# Patient Record
Sex: FEMALE | Race: BLACK OR AFRICAN AMERICAN | NOT HISPANIC OR LATINO
[De-identification: names, ages, dates, MRNs, and addresses within clinical notes are randomized per-mention and may not be internally consistent; named-entity substitution may affect disease eponyms.]

---

## 2017-11-22 ENCOUNTER — RESULT REVIEW (OUTPATIENT)
Age: 37
End: 2017-11-22

## 2018-01-21 ENCOUNTER — TRANSCRIPTION ENCOUNTER (OUTPATIENT)
Age: 38
End: 2018-01-21

## 2018-07-06 PROBLEM — Z00.00 ENCOUNTER FOR PREVENTIVE HEALTH EXAMINATION: Status: ACTIVE | Noted: 2018-07-06

## 2018-07-09 ENCOUNTER — APPOINTMENT (OUTPATIENT)
Dept: DERMATOLOGY | Facility: CLINIC | Age: 38
End: 2018-07-09
Payer: COMMERCIAL

## 2018-07-09 VITALS — HEIGHT: 62 IN | WEIGHT: 162 LBS | BODY MASS INDEX: 29.81 KG/M2

## 2018-07-09 DIAGNOSIS — Z87.2 PERSONAL HISTORY OF DISEASES OF THE SKIN AND SUBCUTANEOUS TISSUE: ICD-10-CM

## 2018-07-09 DIAGNOSIS — D22.9 MELANOCYTIC NEVI, UNSPECIFIED: ICD-10-CM

## 2018-07-09 DIAGNOSIS — Z87.898 PERSONAL HISTORY OF OTHER SPECIFIED CONDITIONS: ICD-10-CM

## 2018-07-09 DIAGNOSIS — L82.1 OTHER SEBORRHEIC KERATOSIS: ICD-10-CM

## 2018-07-09 DIAGNOSIS — Z12.83 ENCOUNTER FOR SCREENING FOR MALIGNANT NEOPLASM OF SKIN: ICD-10-CM

## 2018-07-09 PROCEDURE — 99203 OFFICE O/P NEW LOW 30 MIN: CPT

## 2018-07-12 PROBLEM — Z12.83 SCREENING FOR MALIGNANT NEOPLASM OF SKIN: Status: ACTIVE | Noted: 2018-07-12

## 2018-07-12 PROBLEM — L82.1 DERMATOSIS PAPULOSA NIGRA: Status: ACTIVE | Noted: 2018-07-12

## 2018-10-31 ENCOUNTER — APPOINTMENT (OUTPATIENT)
Dept: ORTHOPEDIC SURGERY | Facility: CLINIC | Age: 38
End: 2018-10-31
Payer: COMMERCIAL

## 2018-10-31 VITALS — HEART RATE: 84 BPM | SYSTOLIC BLOOD PRESSURE: 123 MMHG | DIASTOLIC BLOOD PRESSURE: 85 MMHG

## 2018-10-31 DIAGNOSIS — M25.572 PAIN IN LEFT ANKLE AND JOINTS OF LEFT FOOT: ICD-10-CM

## 2018-10-31 PROCEDURE — 99203 OFFICE O/P NEW LOW 30 MIN: CPT

## 2018-10-31 PROCEDURE — 73610 X-RAY EXAM OF ANKLE: CPT | Mod: LT

## 2018-11-05 ENCOUNTER — OTHER (OUTPATIENT)
Age: 38
End: 2018-11-05

## 2018-11-09 ENCOUNTER — OTHER (OUTPATIENT)
Age: 38
End: 2018-11-09

## 2018-11-13 ENCOUNTER — APPOINTMENT (OUTPATIENT)
Dept: ORTHOPEDIC SURGERY | Facility: CLINIC | Age: 38
End: 2018-11-13
Payer: COMMERCIAL

## 2018-11-13 DIAGNOSIS — M76.822 POSTERIOR TIBIAL TENDINITIS, LEFT LEG: ICD-10-CM

## 2018-11-13 DIAGNOSIS — S93.492D SPRAIN OF OTHER LIGAMENT OF LEFT ANKLE, SUBSEQUENT ENCOUNTER: ICD-10-CM

## 2018-11-13 DIAGNOSIS — M21.6X2 OTHER ACQUIRED DEFORMITIES OF LEFT FOOT: ICD-10-CM

## 2018-11-13 PROCEDURE — 99214 OFFICE O/P EST MOD 30 MIN: CPT

## 2018-11-14 ENCOUNTER — OTHER (OUTPATIENT)
Age: 38
End: 2018-11-14

## 2018-11-28 ENCOUNTER — RESULT REVIEW (OUTPATIENT)
Age: 38
End: 2018-11-28

## 2019-03-29 ENCOUNTER — APPOINTMENT (OUTPATIENT)
Dept: INFECTIOUS DISEASE | Facility: HOSPITAL | Age: 39
End: 2019-03-29
Payer: SELF-PAY

## 2019-03-29 DIAGNOSIS — Z71.89 OTHER SPECIFIED COUNSELING: ICD-10-CM

## 2019-03-29 PROCEDURE — 99401 PREV MED CNSL INDIV APPRX 15: CPT | Mod: 25

## 2019-03-29 PROCEDURE — 90691 TYPHOID VACCINE IM: CPT

## 2019-03-29 PROCEDURE — 90471 IMMUNIZATION ADMIN: CPT | Mod: NC

## 2019-03-29 RX ORDER — ACETAZOLAMIDE 125 MG/1
125 TABLET ORAL
Qty: 14 | Refills: 0 | Status: ACTIVE | COMMUNITY
Start: 2019-03-29 | End: 1900-01-01

## 2019-03-29 RX ORDER — AZITHROMYCIN 500 MG/1
500 TABLET, FILM COATED ORAL
Qty: 3 | Refills: 0 | Status: ACTIVE | COMMUNITY
Start: 2019-03-29 | End: 1900-01-01

## 2019-04-01 ENCOUNTER — APPOINTMENT (OUTPATIENT)
Dept: SURGERY | Facility: CLINIC | Age: 39
End: 2019-04-01
Payer: COMMERCIAL

## 2019-04-01 VITALS
SYSTOLIC BLOOD PRESSURE: 110 MMHG | WEIGHT: 165 LBS | RESPIRATION RATE: 15 BRPM | HEIGHT: 62 IN | OXYGEN SATURATION: 98 % | DIASTOLIC BLOOD PRESSURE: 57 MMHG | BODY MASS INDEX: 30.36 KG/M2 | TEMPERATURE: 98.7 F | HEART RATE: 98 BPM

## 2019-04-01 PROCEDURE — 99203 OFFICE O/P NEW LOW 30 MIN: CPT

## 2019-04-01 RX ORDER — CETIRIZINE HCL 10 MG
TABLET ORAL
Refills: 0 | Status: ACTIVE | COMMUNITY

## 2019-04-02 NOTE — REASON FOR VISIT
[Initial Consultation] : an initial consultation for [Other: _____] : [unfilled] [FreeTextEntry2] : neck mass

## 2019-04-02 NOTE — PHYSICAL EXAM
[Normal] : orientation to person, place, and time: normal [de-identified] : no cervical or supraclavicular adenopathy, trachea midline, thyroid without enlargement or palpable mass, 1 x 0,8 cm subcutaneous mass c/w sebaceous cyst not inflamed.

## 2019-04-02 NOTE — CONSULT LETTER
[Dear  ___] : Dear  [unfilled], [Consult Letter:] : I had the pleasure of evaluating your patient, [unfilled]. [Please see my note below.] : Please see my note below. [Consult Closing:] : Thank you very much for allowing me to participate in the care of this patient.  If you have any questions, please do not hesitate to contact me. [DrAdi  ___] : Dr. ROBIN [DrAdi ___] : Dr. ROBIN [FreeTextEntry2] : Dr. Skye Adams [FreeTextEntry3] : Sincerely yours,\par \par Mouna Ash MD, FACS\par Assistant Professor of Surgery\par [facility]

## 2019-04-02 NOTE — ASSESSMENT
[FreeTextEntry1] : Patient with enlarging neck mass consistent with sebaceous cysts uninflamed. Discussed observation versus surgical excision. The patient would like to undergo excision.  Discussed excision under local anesthesia in [facility] office versus ambulatory surgery. Risks of bleeding, infection and need for reoperation discussed. Questions answered. The patient will contact my office to schedule the procedure.

## 2019-04-02 NOTE — HISTORY OF PRESENT ILLNESS
[de-identified] : Patient referred by Dr. Adams for evaluation of enlarging left neck mass. Patient noted a small left neck mass several months ago, during the facial, attempt to express was performed. Patient reports gradual increase in size, denies infection or pain.

## 2019-05-14 ENCOUNTER — RESULT REVIEW (OUTPATIENT)
Age: 39
End: 2019-05-14

## 2019-06-03 ENCOUNTER — OUTPATIENT (OUTPATIENT)
Dept: OUTPATIENT SERVICES | Facility: HOSPITAL | Age: 39
LOS: 1 days | End: 2019-06-03

## 2019-06-03 VITALS
RESPIRATION RATE: 16 BRPM | WEIGHT: 166.01 LBS | OXYGEN SATURATION: 99 % | TEMPERATURE: 98 F | HEIGHT: 62.5 IN | HEART RATE: 74 BPM | DIASTOLIC BLOOD PRESSURE: 74 MMHG | SYSTOLIC BLOOD PRESSURE: 110 MMHG

## 2019-06-03 DIAGNOSIS — R22.1 LOCALIZED SWELLING, MASS AND LUMP, NECK: ICD-10-CM

## 2019-06-03 DIAGNOSIS — Z86.73 PERSONAL HISTORY OF TRANSIENT ISCHEMIC ATTACK (TIA), AND CEREBRAL INFARCTION WITHOUT RESIDUAL DEFICITS: ICD-10-CM

## 2019-06-03 DIAGNOSIS — Z90.49 ACQUIRED ABSENCE OF OTHER SPECIFIED PARTS OF DIGESTIVE TRACT: Chronic | ICD-10-CM

## 2019-06-03 DIAGNOSIS — Z98.890 OTHER SPECIFIED POSTPROCEDURAL STATES: Chronic | ICD-10-CM

## 2019-06-03 LAB
HCG SERPL-ACNC: < 5 MIU/ML — SIGNIFICANT CHANGE UP
HCT VFR BLD CALC: 38.4 % — SIGNIFICANT CHANGE UP (ref 34.5–45)
HGB BLD-MCNC: 12.2 G/DL — SIGNIFICANT CHANGE UP (ref 11.5–15.5)
MCHC RBC-ENTMCNC: 27.5 PG — SIGNIFICANT CHANGE UP (ref 27–34)
MCHC RBC-ENTMCNC: 31.8 % — LOW (ref 32–36)
MCV RBC AUTO: 86.7 FL — SIGNIFICANT CHANGE UP (ref 80–100)
NRBC # FLD: 0 K/UL — SIGNIFICANT CHANGE UP (ref 0–0)
PLATELET # BLD AUTO: 427 K/UL — HIGH (ref 150–400)
PMV BLD: 10.4 FL — SIGNIFICANT CHANGE UP (ref 7–13)
RBC # BLD: 4.43 M/UL — SIGNIFICANT CHANGE UP (ref 3.8–5.2)
RBC # FLD: 13.9 % — SIGNIFICANT CHANGE UP (ref 10.3–14.5)
WBC # BLD: 9.66 K/UL — SIGNIFICANT CHANGE UP (ref 3.8–10.5)
WBC # FLD AUTO: 9.66 K/UL — SIGNIFICANT CHANGE UP (ref 3.8–10.5)

## 2019-06-03 NOTE — H&P PST ADULT - HISTORY OF PRESENT ILLNESS
37 y/o female presents to PST for preoperative evaluation with dx of localized swelling, mass and lump, neck. Pt reports slow growing left neck cyst since October 2018. Scheduled for Excision Left Neck Mass on 6/14/2019. H/o left neck cyst excision in 2008- benign results.

## 2019-06-03 NOTE — H&P PST ADULT - NSANTHOSAYNRD_GEN_A_CORE
No. MUNA screening performed.  STOP BANG Legend: 0-2 = LOW Risk; 3-4 = INTERMEDIATE Risk; 5-8 = HIGH Risk

## 2019-06-03 NOTE — H&P PST ADULT - NEUROLOGICAL COMMENTS
h/o "TIA" in 2015- pt reports she had facial paralysis and was placed on Aspirin- does not follow up with Neuro and not currently on meds. h/o "TIA" in 2015- pt reports she had facial paralysis and was placed on Aspirin- does not follow up with Neuro and not currently on Aspirin

## 2019-06-03 NOTE — H&P PST ADULT - NSICDXPASTSURGICALHX_GEN_ALL_CORE_FT
PAST SURGICAL HISTORY:  H/O neck surgery excision of left neck cyst    S/P cholecystectomy     S/P LASIK surgery

## 2019-06-03 NOTE — H&P PST ADULT - TOBACCO USE
Attempted to reach pt for f/u, but no answer. Detailed message left with notes per Dr. Arianne Santos. Left number to call and schedule f/u appt or for any questions or concerns. Never smoker

## 2019-06-03 NOTE — H&P PST ADULT - NSICDXPASTMEDICALHX_GEN_ALL_CORE_FT
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 was treated with Aspirin. No current use. Pt states neuro workup was "negative".    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 She was treated with Aspirin for a "short period". No current use of Aspirin at present. Pt states neuro workup was "negative". Does not recall when last brain scan or neuro visit was    Localized swelling, mass and lump, neck left neck    Seasonal allergies

## 2019-06-03 NOTE — H&P PST ADULT - NSICDXPROBLEM_GEN_ALL_CORE_FT
PROBLEM DIAGNOSES  Problem: Localized swelling, mass and lump, neck  Assessment and Plan: Scheduled for Excision Left Neck Mass on 6/14/2019  Preop instruction given, pt verbalized understanding   GI prophylaxis provided  Chlorhexidine wash provided. Pt verbalized understanding of wash instructions and was able to demonstrate teach back    Problem: History of TIA (transient ischemic attack)  Assessment and Plan: Pt reports she had a TIA in 2015 that resulted in facial paralysis. She does not recall last neuro visit.  Neuro clearance requested

## 2019-06-03 NOTE — H&P PST ADULT - NEGATIVE NEUROLOGICAL SYMPTOMS
no weakness/no vertigo/no loss of sensation/no difficulty walking/no paresthesias/no generalized seizures/no transient paralysis/no focal seizures/no loss of consciousness/no hemiparesis/no headache/no confusion/no syncope/no tremors/no facial palsy

## 2019-06-13 ENCOUNTER — TRANSCRIPTION ENCOUNTER (OUTPATIENT)
Age: 39
End: 2019-06-13

## 2019-06-14 ENCOUNTER — OUTPATIENT (OUTPATIENT)
Dept: OUTPATIENT SERVICES | Facility: HOSPITAL | Age: 39
LOS: 1 days | Discharge: HOME | End: 2019-06-14
Payer: COMMERCIAL

## 2019-06-14 ENCOUNTER — APPOINTMENT (OUTPATIENT)
Dept: SURGERY | Facility: HOSPITAL | Age: 39
End: 2019-06-14

## 2019-06-14 ENCOUNTER — RESULT REVIEW (OUTPATIENT)
Age: 39
End: 2019-06-14

## 2019-06-14 VITALS
SYSTOLIC BLOOD PRESSURE: 122 MMHG | OXYGEN SATURATION: 100 % | DIASTOLIC BLOOD PRESSURE: 82 MMHG | RESPIRATION RATE: 19 BRPM | HEART RATE: 80 BPM

## 2019-06-14 VITALS
RESPIRATION RATE: 16 BRPM | HEIGHT: 62.5 IN | HEART RATE: 95 BPM | TEMPERATURE: 98 F | WEIGHT: 166.01 LBS | SYSTOLIC BLOOD PRESSURE: 131 MMHG | OXYGEN SATURATION: 99 % | DIASTOLIC BLOOD PRESSURE: 76 MMHG

## 2019-06-14 DIAGNOSIS — Z98.890 OTHER SPECIFIED POSTPROCEDURAL STATES: Chronic | ICD-10-CM

## 2019-06-14 DIAGNOSIS — R22.1 LOCALIZED SWELLING, MASS AND LUMP, NECK: ICD-10-CM

## 2019-06-14 DIAGNOSIS — Z90.49 ACQUIRED ABSENCE OF OTHER SPECIFIED PARTS OF DIGESTIVE TRACT: Chronic | ICD-10-CM

## 2019-06-14 LAB — HCG UR QL: NEGATIVE — SIGNIFICANT CHANGE UP

## 2019-06-14 PROCEDURE — 88304 TISSUE EXAM BY PATHOLOGIST: CPT | Mod: 26

## 2019-06-14 PROCEDURE — 11422 EXC H-F-NK-SP B9+MARG 1.1-2: CPT

## 2019-06-14 RX ORDER — ALPRAZOLAM 0.25 MG
1 TABLET ORAL
Qty: 0 | Refills: 0 | DISCHARGE

## 2019-06-14 RX ORDER — SODIUM CHLORIDE 9 MG/ML
1000 INJECTION, SOLUTION INTRAVENOUS
Refills: 0 | Status: DISCONTINUED | OUTPATIENT
Start: 2019-06-14 | End: 2019-06-29

## 2019-06-14 RX ORDER — ACETAMINOPHEN 500 MG
650 TABLET ORAL EVERY 6 HOURS
Refills: 0 | Status: DISCONTINUED | OUTPATIENT
Start: 2019-06-14 | End: 2019-06-29

## 2019-06-14 RX ORDER — ACETAMINOPHEN 500 MG
2 TABLET ORAL
Qty: 0 | Refills: 0 | DISCHARGE
Start: 2019-06-14

## 2019-06-14 RX ORDER — CETIRIZINE HYDROCHLORIDE 10 MG/1
1 TABLET ORAL
Qty: 0 | Refills: 0 | DISCHARGE

## 2019-06-14 NOTE — BRIEF OPERATIVE NOTE - OPERATION/FINDINGS
Local anesthetic applied to surgical site of left anterior neck. Mass identified, dissected, excised and sent for permanent.

## 2019-06-14 NOTE — ASU DISCHARGE PLAN (ADULT/PEDIATRIC) - ASU DC SPECIAL INSTRUCTIONSFT
call for appt time for 6/19/19 call for appt time for 6/19/19    You were given 1000mg IV Tylenol for pain management.  Please DO NOT take any Tylenol containing products, such as  Vicodin, Percocet, Excedrin, many cold preparations for the next 6 hours (until 9p).  DO NOT EXCEED 3000MG OF TYLENOL OVER 24 HOURS.

## 2019-06-14 NOTE — ASU DISCHARGE PLAN (ADULT/PEDIATRIC) - CARE PROVIDER_API CALL
Mouna Ash)  Surgery  [address] [facility], [address]  [place] [address]  Phone: [phone]  Fax: [phone]  Follow Up Time:

## 2019-06-14 NOTE — BRIEF OPERATIVE NOTE - NSICDXBRIEFPROCEDURE_GEN_ALL_CORE_FT
PROCEDURES:  Excisional biopsy, mass, soft tissue, neck 14-Jun-2019 15:32:52 left, anterior Norm Saldivar

## 2019-06-17 ENCOUNTER — TRANSCRIPTION ENCOUNTER (OUTPATIENT)
Age: 39
End: 2019-06-17

## 2019-06-17 PROBLEM — G45.9 TRANSIENT CEREBRAL ISCHEMIC ATTACK, UNSPECIFIED: Chronic | Status: ACTIVE | Noted: 2019-06-03

## 2019-06-17 PROBLEM — R22.1 LOCALIZED SWELLING, MASS AND LUMP, NECK: Chronic | Status: ACTIVE | Noted: 2019-06-03

## 2019-06-17 PROBLEM — J30.2 OTHER SEASONAL ALLERGIC RHINITIS: Chronic | Status: ACTIVE | Noted: 2019-06-03

## 2019-06-19 ENCOUNTER — APPOINTMENT (OUTPATIENT)
Dept: SURGERY | Facility: CLINIC | Age: 39
End: 2019-06-19
Payer: COMMERCIAL

## 2019-06-19 PROCEDURE — 99024 POSTOP FOLLOW-UP VISIT: CPT

## 2019-06-19 NOTE — PHYSICAL EXAM
[de-identified] : no cervical or supraclavicular adenopathy, trachea midline, thyroid without enlargement. incision healing, scar min discussed.   [Normal] : orientation to person, place, and time: normal

## 2019-06-19 NOTE — ASSESSMENT
[FreeTextEntry1] : Patient with enlarging neck mass consistent with sebaceous cysts uninflamed.doing well postop RTO 4 mo

## 2019-06-19 NOTE — HISTORY OF PRESENT ILLNESS
[de-identified] : Patient referred by Dr. Adams for evaluation of enlarging left neck mass. Patient noted a small left neck mass several months ago, during the facial, attempt to express was performed. Patient reports gradual increase in size, denies infection or pain.\par 6/14/19 excision neck mass.  denies pain or drainage. path pending

## 2019-06-21 LAB — SURGICAL PATHOLOGY STUDY: SIGNIFICANT CHANGE UP

## 2019-10-02 ENCOUNTER — RESULT REVIEW (OUTPATIENT)
Age: 39
End: 2019-10-02

## 2019-10-07 ENCOUNTER — APPOINTMENT (OUTPATIENT)
Dept: SURGERY | Facility: CLINIC | Age: 39
End: 2019-10-07
Payer: COMMERCIAL

## 2019-10-07 DIAGNOSIS — R22.1 LOCALIZED SWELLING, MASS AND LUMP, NECK: ICD-10-CM

## 2019-10-07 PROCEDURE — 99213 OFFICE O/P EST LOW 20 MIN: CPT

## 2019-10-07 NOTE — ASSESSMENT
[FreeTextEntry1] : Patient with enlarging neck mass consistent with sebaceous cysts  doing well postop   Patient will continue under care of PCP and return as needed.

## 2019-10-07 NOTE — HISTORY OF PRESENT ILLNESS
[de-identified] : Patient referred by Dr. Adams for evaluation of enlarging left neck mass. Patient noted a small left neck mass several months ago, during the facial, attempt to express was performed. Patient reports gradual increase in size, denies infection or pain.\par 6/14/19 excision neck mass.  denies pain or drainage. path sebaceous cyst.  denies pain or recurrent swelling.

## 2020-06-18 ENCOUNTER — TRANSCRIPTION ENCOUNTER (OUTPATIENT)
Age: 40
End: 2020-06-18

## 2020-09-08 NOTE — PHYSICAL EXAM
[de-identified] : no cervical or supraclavicular adenopathy, trachea midline, thyroid without enlargement. incision well healed, no recurrent mass [Normal] : no neck adenopathy [de-identified] : Skin:  normal appearance.  no rash, nodules, vesicles, or erythema,\par Musculoskeletal:  full range of motion and no deformities appreciated\par Neurological:  grossly intact\par Psychiatric:  oriented to person, place and time with appropriate affect no

## 2020-10-09 ENCOUNTER — RESULT REVIEW (OUTPATIENT)
Age: 40
End: 2020-10-09

## 2022-03-01 ENCOUNTER — RESULT REVIEW (OUTPATIENT)
Age: 42
End: 2022-03-01

## 2022-05-06 ENCOUNTER — RESULT REVIEW (OUTPATIENT)
Age: 42
End: 2022-05-06

## 2022-05-14 NOTE — H&P PST ADULT - CONSTITUTIONAL DETAILS
1 Principal Discharge DX:	Viral URI with cough  Secondary Diagnosis:	Wheezing   well-developed/well-groomed/no distress/well-nourished

## 2023-03-14 NOTE — ASU PATIENT PROFILE, ADULT - NS PRO ABUSE SCREEN SUSPICION NEGLECT YN
Hospital Medicine History & Physical      PCP: John Camacho MD    Date of Admission: 3/13/2023    Date of Service: Pt seen/examined on 3/14/2023     Chief Complaint:    Chief Complaint   Patient presents with    Abdominal Pain     From the [address] [place] [address] rehab; given 4 mg zofran in squad; vomit dark brown per nursing home. History Of Present Illness: The patient is a 82 y.o. male with pmhx of CAD, COPD, dementia, depression, HTN, HLD, GERD who presented to [facility] ED with concern for abdominal pain. History obtained per EMR. Patient has dementia and is not very good historian, unsure why he is here and has no complaints. Oleary endorse cough. He does say that sometimes when he eats at SNF, he chokes and has difficulty swallowing at times.      Past Medical History:        Diagnosis Date    Atherosclerotic heart disease     CAD (coronary artery disease)     Cancer (HCC)     skin    COPD (chronic obstructive pulmonary disease) (HCC)     Dementia (HCC)     Depression     Duodenal ulcer without hemorrhage or perforation     Falls frequently     GERD without esophagitis     Hernia     Hyperlipidemia     Hypertension     Hypokalemia     Lack of coordination     falls    Melanoma ([address]     MI (myocardial infarction) ([address]     Muscle weakness (generalized)     Protein calorie malnutrition ([address]     Reflux     Sepsis ([address]     Spinal stenosis     UTI (urinary tract infection)        Past Surgical History:        Procedure Laterality Date    CHOLECYSTECTOMY      COLONOSCOPY  8/10/12    CORONARY ANGIOPLASTY WITH STENT PLACEMENT      PROSTATE BIOPSY      SKIN BIOPSY      UPPER GASTROINTESTINAL ENDOSCOPY  5/14/12    UPPER GASTROINTESTINAL ENDOSCOPY N/A 6/11/2019    EGD BIOPSY performed by Marino Kennedy DO at [address] [place] [address] N/A 8/8/2019    EGD performed by Ramo Eng MD at [facility]       Medications Prior to Admission:    Prior to Admission medications    Medication Sig Start Date End Date Taking? Authorizing Provider   sulfamethoxazole-trimethoprim (BACTRIM;SEPTRA) 400-80 MG per tablet Take 1 tablet by mouth daily Prophylactic x 3 months 2/13/23-5/18/23   Yes Historical Provider, MD   Cranberry 450 MG CAPS Take 450 mg by mouth in the morning and at bedtime UTI prevention   Yes Historical Provider, MD   aspirin EC 81 MG EC tablet Take 1 tablet by mouth daily 11/9/22   Keisha Cullen MD   valsartan (DIOVAN) 40 MG tablet Take 0.5 tablets by mouth at bedtime 11/9/22   Keisha Cullen MD   carvedilol (COREG) 6.25 MG tablet Take 1 tablet by mouth 2 times daily (with meals) 11/9/22   Keisha Cullen MD   spironolactone (ALDACTONE) 25 MG tablet Take 0.5 tablets by mouth daily 11/9/22   Keisha Cullen MD   melatonin 3 MG TABS tablet Take 3 mg by mouth in the morning. Historical Provider, MD   sertraline (ZOLOFT) 50 MG tablet Take 100 mg by mouth in the morning. Historical Provider, MD   pregabalin (LYRICA) 25 MG capsule Take 75 mg by mouth in the morning and 75 mg at noon and 75 mg before bedtime. Historical Provider, MD   omeprazole (PRILOSEC) 20 MG delayed release capsule Take 20 mg by mouth daily    Historical Provider, MD   amLODIPine (NORVASC) 10 MG tablet Take 10 mg by mouth daily  7/26/22  Historical Provider, MD   magnesium hydroxide (MILK OF MAGNESIA) 400 MG/5ML suspension Take 30 mLs by mouth daily as needed for Constipation    Historical Provider, MD   bisacodyl (DULCOLAX) 10 MG suppository Place 10 mg rectally daily as needed for Constipation If MOM ineffective. Historical Provider, MD   acetaminophen (TYLENOL) 325 MG tablet Take 650 mg by mouth every 6 hours as needed for Pain    Historical Provider, MD       Allergies:  Fluoxetine, Benazepril, Hydrocodone, Morphine, Penicillins, Simvastatin, Morphine and related, and Sucralfate    Social History:     TOBACCO:   reports that he has quit smoking.  His smoking use included cigarettes. He has a 2.50 pack-year smoking history. He has never used smokeless tobacco.  ETOH:   reports no history of alcohol use. Family History:   Positive as follows:    History reviewed. No pertinent family history. REVIEW OF SYSTEMS:       Constitutional: Negative for fever   HENT: Negative for sore throat   Eyes: Negative for redness   Respiratory: Negative  for dyspnea, +cough   Cardiovascular: Negative for chest pain   Gastrointestinal: Negative for vomiting, diarrhea   Genitourinary: Negative for hematuria   Musculoskeletal: Negative for arthralgias   Skin: Negative for rash   Neurological: Negative for syncope   Hematological: Negative for adenopathy   Psychiatric/Behavorial: Negative for anxiety    PHYSICAL EXAM:    BP (!) 83/40   Pulse 55   Temp 99.1 °F (37.3 °C) (Oral)   Resp 14   Ht 6' (1.829 m)   Wt 175 lb 3.2 oz (79.5 kg)   SpO2 96%   BMI 23.76 kg/m²     Gen: No distress. Alert. Pleasant male, sleepy but easily arousable   Eyes: PERRL. No sclera icterus. No conjunctival injection. ENT: No discharge. Pharynx clear. Neck: No JVD. No Carotid Bruit. Trachea midline. Resp: No accessory muscle use. No wheezes. +Crackles and rhonchi in lower lung fields   CV: Regular rate. Regular rhythm. No murmur. No rub. No edema. Peripheral Pulses: +2 palpable, equal bilaterally   GI: Non-tender. Non-distended. No masses. No organomegaly. Normal bowel sounds. No hernia. Skin: Warm and dry. No nodule on exposed extremities. No rash on exposed extremities. M/S: No cyanosis. No joint deformity. No clubbing. Neuro: Awake. Grossly nonfocal    Psych: Oriented x 1. No anxiety or agitation.      Lab Results   Component Value Date    WBC 9.5 03/13/2023    HGB 13.9 03/13/2023    HCT 42.2 03/13/2023    MCV 86.6 03/13/2023     03/13/2023     Lab Results   Component Value Date     03/13/2023    K 4.9 03/13/2023     03/13/2023    CO2 21 03/13/2023    BUN 29 (H) 03/13/2023 CREATININE 1.3 03/13/2023    GLUCOSE 130 (H) 03/13/2023    CALCIUM 9.2 03/13/2023    PROT 7.5 03/13/2023    LABALBU 3.8 03/13/2023    BILITOT 0.5 03/13/2023    ALKPHOS 113 03/13/2023    AST 20 03/13/2023    ALT 9 (L) 03/13/2023    LABGLOM 54 (A) 03/13/2023    GFRAA >60 07/26/2022    AGRATIO 1.0 (L) 03/13/2023    GLOB 2.9 09/17/2020           CARDIAC ENZYMES  Recent Labs     03/13/23 1930   TROPONINI <0.01       U/A:    Lab Results   Component Value Date/Time    NITRITE neg 01/26/2013 10:12 AM    COLORU Yellow 03/13/2023 09:00 PM    WBCUA 0-2 03/13/2023 09:00 PM    RBCUA >100 03/13/2023 09:00 PM    MUCUS 3+ 11/17/2017 07:15 AM    BACTERIA 1+ 03/13/2023 09:00 PM    CLARITYU SL CLOUDY 03/13/2023 09:00 PM    SPECGRAV 1.025 03/13/2023 09:00 PM    LEUKOCYTESUR Negative 03/13/2023 09:00 PM    BLOODU LARGE 03/13/2023 09:00 PM    GLUCOSEU Negative 03/13/2023 09:00 PM    AMORPHOUS Rare 07/22/2022 01:53 AM       CULTURES  COVID and Flu not detected     EKG:  Normal sinus rhythmNormal ECGWhen compared with ECG of 06-DEC-2022 12:15,Previous ECG has undetermined rhythm, needs reviewT wave inversion no longer evident in Anterior leadsConfirmed by Stacy Phelps MD, Elena Machado ([id number]) on 3/14/2023 7:33:50 AM    RADIOLOGY  CT CHEST ABDOMEN PELVIS W CONTRAST Additional Contrast? None   Final Result   Mild bibasilar atelectasis or early infiltrates posteriorly with associated   mild bronchiectasis. Recommend follow-up with serial chest x-rays. Mildly atherosclerotic thoracic aorta with no aneurysm or dissection and   unremarkable mediastinum. Multiple old rib fractures on the left with no acute fracture seen. Previous cholecystectomy which is unchanged with no acute abnormality seen      Mild fecal impaction in the rectum with no obstruction. Extensive diverticula throughout the colon with no pericolonic inflammation. Mild prostatic enlargement with no pelvic mass or active inflammation and a   normal appendix. no CT Head W/O Contrast   Final Result   No acute intracranial abnormality. XR CHEST PORTABLE   Final Result   Stable chronic interstitial changes in the lungs. Pertinent previous results reviewed   Echocardiogram from 11/3/22  Summary   The left ventricle is normal in size with normal wall thickness. Left ventricular systolic function is moderately reduced with a visually   estimated ejection fraction of 35-40 %. EF estimated by Jain's method at 35 %. The mid to apical septum, mid-apical inferior, mid-apical anterior, apical   lateral segments appear hypokinetic. Basilar hyperkinesis. Distribution of wall motion abnormalities seen with Takotsubo versus   extensive infarction. Grade I diastolic dysfunction with normal LV pressure. There is no evidence of a left ventricular thrombus. Normal right ventricular size and function. Systolic pulmonary artery pressure (SPAP) is normal and estimated at 26 mmHg   (right atrial pressure 8 mmHg). The IVC is dilated in size (>2.1 cm) but collapses >50% with respiration   consistent with elevated right atrial pressures (8 mmHg) . Compared with the prior study 3/5/2018, significant changes have occurred. Devan Roldan MD have reviewed the chart on [facility] and personally interviewed and examined patient, reviewed the data (labs and imaging) and after discussion with my PA formulated the plan. Agree with note with the following edits. HPI:     This is an 73-year-old white male who lives in a nursing home and cannot participate in the H&P. He has advanced dementia. The patient has a past medical history of CAD, COPD, depression, dementia, GERD, hyperlipidemia and he was sent to the emergency room for concerns of abdominal pain. Work-up in the emergency room showed he had pneumonia. He was also found to have acute respiratory failure.   He was admitted to the hospital.  This morning he is sitting up in bed.  He does not know he is in the hospital.  He appears pleasantly confused. No agitation. I reviewed the patient's Past Medical History, Past Surgical History, Medications, and Allergies. Physical exam:    BP (!) 104/59   Pulse 93   Temp 97.9 °F (36.6 °C) (Oral)   Resp 14   Ht 6' (1.829 m)   Wt 175 lb 3.2 oz (79.5 kg)   SpO2 96%   BMI 23.76 kg/m²     Gen: No distress. Alert. Eyes: PERRL. No sclera icterus. No conjunctival injection. ENT: No discharge. Pharynx clear. Neck: Trachea midline. Normal thyroid. Resp: No accessory muscle use. + crackles. No wheezes. No rhonchi. No dullness on percussion. CV: Regular rate. Regular rhythm. No murmur or rub. No edema. GI: Non-tender. Non-distended. No masses. No organomegaly. Normal bowel sounds. No hernia. ASSESSMENT/PLAN:    Principal Problem:    Acute respiratory failure with hypoxemia (HCC)  Active Problems:    Acute hypoxemic respiratory failure (HCC)    Acute metabolic encephalopathy    Cardiomyopathy [address]    CAD (coronary artery disease)    HTN (hypertension)    Dementia (HCC)    Abdominal pain  Resolved Problems:    * No resolved hospital problems. *      #Hospital Acquired Pneumonia - due to gram neg organism or MRSA  #Acute hypoxic respiratory failure   #COPD with AE   -no WBC, afebrile, procalc neg, LA neg   -no baseline home oxygen, requiring 3 L O2  -continue to wean as tolerated    -IV cefepime and vancomycin   -IV solumedrol   -duonebs   -blood cultures pending  -sputum culture pending   -pulmonology consulted     #Hypotension   -IVF boluses with continuous IVF   -holding BP meds   -monitor BP     #Metabolic encephalopathy   -likely 2/2 to above. . patient does have hx of dementia     #Reported vomiting   -has not had any since admission   -prn zofran   -IVF   -CT abdomen as above   -GI consulted, planning for possible EGD later     #Hematuria   -per UA  -none has been witness by staff today     #Fecal impaction   -enema and bowel regimen   -GI consulted       #Dysphagia   -speech eval pending   -on pureed diet at SNF    #CAD   -on ASA, BB, ARB   -holding BB and ARB 2/2 to hypotension     #Diastolic HF  #Stress induced cardiomyopathy    -last echo as above with EF 35%   -holding BB ARB and aldactone 2/2 to low Bps   -per last admission, not candidate for intervention     #HTN   -holding valsartan, coreg, aldactone    #HLD   -not on statin     #Old rib fractures   -non-acute     #Dementia   -supportive care     DVT Prophylaxis: Lovenox   Diet: ADULT DIET;  Clear Liquid  Code Status: Full Code    Sherill Jeans, PA  03/14/23  10:41 AM    Nestor Wilson MD 3/14/2023 12:49 PM

## 2024-05-06 ENCOUNTER — APPOINTMENT (OUTPATIENT)
Dept: ORTHOPEDIC SURGERY | Facility: CLINIC | Age: 44
End: 2024-05-06
Payer: COMMERCIAL

## 2024-05-06 DIAGNOSIS — M25.561 PAIN IN RIGHT KNEE: ICD-10-CM

## 2024-05-06 DIAGNOSIS — M25.562 PAIN IN RIGHT KNEE: ICD-10-CM

## 2024-05-06 DIAGNOSIS — S80.00XS: ICD-10-CM

## 2024-05-06 DIAGNOSIS — M25.551 PAIN IN RIGHT HIP: ICD-10-CM

## 2024-05-06 PROCEDURE — 72170 X-RAY EXAM OF PELVIS: CPT

## 2024-05-06 PROCEDURE — 99204 OFFICE O/P NEW MOD 45 MIN: CPT

## 2024-05-06 PROCEDURE — 73564 X-RAY EXAM KNEE 4 OR MORE: CPT | Mod: LT,RT

## 2024-05-06 RX ORDER — DICLOFENAC POTASSIUM 50 MG/1
50 TABLET, COATED ORAL 3 TIMES DAILY
Qty: 60 | Refills: 2 | Status: ACTIVE | COMMUNITY
Start: 2024-05-06 | End: 1900-01-01

## 2024-05-06 NOTE — HISTORY OF PRESENT ILLNESS
[de-identified] : 44yo F pw R>L knee pain, R hip pain after a fall.  Pt notes extending sitting and ascending/descending stairs worsens the pain and symptoms.  Does not recall an inciting traumatic event.  Pt has tried activity modification and NSAIDs with minimal relief, pain level remains a 6/10.  Also notes increased stiffness/worse motion in the knee.  Has not trialed physical therapy or injections.  Pt denies numbness, paresthesias, f/c.  Pt notes the pain interferes with activities of daily life and that overall mobility has been decreased.  They wish to discuss possible treatment options and return to baseline activity and mobility before symptomatic onset.

## 2024-05-06 NOTE — PHYSICAL EXAM
[de-identified] : Gen: NAD Resp: Nonlabored respirations, no SOB Gait: Nl  bl Knee: Skin intact No effusion 0-130 AROM Nontender MJL/LJL, superior/inferior pole patella 5/5 IP Q EHL TA GS SILT L3-S1 2+ dp pt wwp bcr  1A lachman and (-) pivot shift Grade 1 posterior drawer Stable to v/v at 0 and 30 degrees (-) Dial test at 30, 90 degrees  (-) Pro, Thessaly Stable and pain-free 2 leg squat  1+ patellar translation (-) pain with patellar compression/grind (-) J sign (-) apprehension    Side: Skin in tact Tenderness: GT   Hip ROM                               Flexion              Extension               IR               ER Affected               120                    10                         25              40 Normal                  120                    10                         25              40   Strength                              Flexion              Extension           Abduction        Adduction  Affected               5                        5                               5                     5                     Normal                  5                        5                               5                     5  Provocative Tests: Log roll  (-) FADIR   (-) ADAN   (-) Marcy   (-) Resisted SLR  (-)   [de-identified] : The following radiographs were ordered and read by me during this patient's visit. I reviewed each radiograph in detail with the patient and discussed the findings as highlighted below.   4 views of the R knee + L knee, 1 AP pelvis were obtained today that show no fracture, or dislocation. There are no degenerative changes seen. There is no malalignment. Otherwise unremarkable.  Small 1cm non aggressive lesion in proximal R tibial metaphysis - no malignant features.

## 2024-05-06 NOTE — DISCUSSION/SUMMARY
[de-identified] : 43yF pw bl knee contusions, R hip GT bursitis after a fall.   Discussed small tibial lesion benign appearing, will repeat XR in 3-6m to eval and obtain MRI if any change. The patient was extensively counseled on treatment options including but not limited to observation, rest/activity modification, bracing, anti-inflammatory medications, physical therapy, injections, and surgery.  The natural history of the disease was thoroughly explained.  We discussed that the majority of the time, this condition can be initially treated conservatively. The patient will proceed with: -PT -diclofenac rx provided - pt instructed to take with meals and not with other nsaid's including advil, aleve, and toradol.  The pt understands to stop taking the medication if any stomach sx develop or they develop any type of bleeding or bruising, at which point they will present to their PMD -pt was instructed on the importance of resting, icing and elevating to minimize swelling -RTC 6w   I have personally obtained the history, reviewed the ROS as noted, and performed the physical examination today.  The patient and I discussed the assessment and options and developed the plan.  All questions were answered and the patient stated their understanding of the treatment plan and appreciation of the visit.   My cumulative time spent on this patient's visit included: Preparation for the visit, review of the medical records, review of pertinent diagnostic studies, examination and counseling of the patient on the above diagnosis, treatment plan and prognosis, orders of diagnostic tests, medications and/or appropriate procedures and documentation in the medical records of today's visit.   Ceasar Berger MD

## 2024-05-07 VITALS
HEIGHT: 62 IN | WEIGHT: 190 LBS | HEART RATE: 93 BPM | BODY MASS INDEX: 34.96 KG/M2 | SYSTOLIC BLOOD PRESSURE: 132 MMHG | DIASTOLIC BLOOD PRESSURE: 83 MMHG

## 2024-07-08 ENCOUNTER — APPOINTMENT (OUTPATIENT)
Dept: ORTHOPEDIC SURGERY | Facility: CLINIC | Age: 44
End: 2024-07-08
Payer: OTHER MISCELLANEOUS

## 2024-07-08 VITALS
DIASTOLIC BLOOD PRESSURE: 85 MMHG | WEIGHT: 190 LBS | HEART RATE: 96 BPM | SYSTOLIC BLOOD PRESSURE: 117 MMHG | BODY MASS INDEX: 34.96 KG/M2 | HEIGHT: 62 IN

## 2024-07-08 DIAGNOSIS — S93.492A SPRAIN OF OTHER LIGAMENT OF LEFT ANKLE, INITIAL ENCOUNTER: ICD-10-CM

## 2024-07-08 PROCEDURE — 99455 WORK RELATED DISABILITY EXAM: CPT

## 2024-07-29 ENCOUNTER — APPOINTMENT (OUTPATIENT)
Dept: ORTHOPEDIC SURGERY | Facility: CLINIC | Age: 44
End: 2024-07-29
Payer: OTHER MISCELLANEOUS

## 2024-07-29 DIAGNOSIS — M25.572 PAIN IN LEFT ANKLE AND JOINTS OF LEFT FOOT: ICD-10-CM

## 2024-07-29 PROCEDURE — 99455 WORK RELATED DISABILITY EXAM: CPT

## 2024-07-29 NOTE — DISCUSSION/SUMMARY
[de-identified] : 43yF pw L high ankle sprain.  The patient was extensively counseled on treatment options including but not limited to observation, rest/activity modification, bracing, anti-inflammatory medications, physical therapy, injections, and surgery.  The natural history of the disease was thoroughly explained.  We discussed that the majority of the time, this condition can be initially treated conservatively. The patient will proceed with: -adv to wbat in CAM boot, PT -diclofenac rx provided - pt instructed to take with meals and not with other nsaid's including advil, aleve, and toradol.  The pt understands to stop taking the medication if any stomach sx develop or they develop any type of bleeding or bruising, at which point they will present to their PMD -pt was instructed on the importance of resting, icing and elevating to minimize swelling -RTC 3w   I have personally obtained the history, reviewed the ROS as noted, and performed the physical examination today.  The patient and I discussed the assessment and options and developed the plan.  All questions were answered and the patient stated their understanding of the treatment plan and appreciation of the visit.   My cumulative time spent on this patient's visit included: Preparation for the visit, review of the medical records, review of pertinent diagnostic studies, examination and counseling of the patient on the above diagnosis, treatment plan and prognosis, orders of diagnostic tests, medications and/or appropriate procedures and documentation in the medical records of today's visit.   Ceasar Berger MD

## 2024-07-29 NOTE — RETURN TO WORK/SCHOOL
[FreeTextEntry1] : She will be out of work for three weeks pending re-evaluation. She should remain non weightbearing. [FreeTextEntry2] : Ceasar Berger

## 2024-07-29 NOTE — ASSESSMENT
[FreeTextEntry1] : pt fell and twisted ankle 7/4 at work [FreeTextEntry5] : 100 [FreeTextEntry6] : nwroma w high ankle sprain

## 2024-07-29 NOTE — HISTORY OF PRESENT ILLNESS
[de-identified] : Sharri is a 42yo F pw left ankle injury. DOI 07/4/2024, twisted her ankle while walking down a hill at work. She was unable to weightbear and walk afterwards without significant pain and iced her ankle immediately after injury. There was no improvement in pain following ice and ankle became more swollen, went to urgent care next day. Xrays were negative for fx and she was given aircast and orthopedic shoe. Unable to use cane or crutch due to current wrist injury. Since incident she has not been able to return to work because she is still unable to walk or stand. She uses voltaren and ice daily for pain and swelling. Denies any previous injury.  7/8 interval - injured her L ankle 7/4 rolling it in parking lot walking down a slope with wedge shoes, has had trouble walking, using crutches, swelling and pain  7/29 interval - fell on R shoulder when in bath, had a bruise but feels mostly recovered from this.  Still unable to WB on ankle, swelling when she doesn't ice or elevate

## 2024-07-29 NOTE — PHYSICAL EXAM
[de-identified] : Constitutional: Well-nourished, well-developed, No acute distress  Respiratory:  Good respiratory effort, no SOB  Psychiatric: Pleasant and normal affect, alert and oriented x3   Ankle: Skin: Clean dry and intact  Ankle: Moderate swelling ecchymosis of the anterolateral ankle and foot, no marked deformities or malalignment  Tenderness: ATFL, CFL, Lateral ankle, high ankle ROM: Mild limitation of PF and Inversion due to pain/swelling, NL DF and Eversion.   Resistive Testing: Mild pain with resisted eversion and DF.  painless resisted  plantar flexion, and inversion.   Special tests: + anterior drawer for pain without laxity, + talar tilt for pain without laxity 5/5 EHL TA GS SILT L3-S1 2+ dp pt wwp bcr  [de-identified] : I independently reviewed and interpreted the xrays of the ankle - no fracture, no dislocation or OA.  No other acute osseous abnormalities.

## 2024-08-19 ENCOUNTER — APPOINTMENT (OUTPATIENT)
Dept: ORTHOPEDIC SURGERY | Facility: CLINIC | Age: 44
End: 2024-08-19
Payer: OTHER MISCELLANEOUS

## 2024-08-19 DIAGNOSIS — S93.492A SPRAIN OF OTHER LIGAMENT OF LEFT ANKLE, INITIAL ENCOUNTER: ICD-10-CM

## 2024-08-19 PROCEDURE — 99214 OFFICE O/P EST MOD 30 MIN: CPT

## 2024-08-19 NOTE — HISTORY OF PRESENT ILLNESS
[de-identified] : Sharri is a 44yo F pw left ankle injury. DOI 07/4/2024, twisted her ankle while walking down a hill at work. She was unable to weightbear and walk afterwards without significant pain and iced her ankle immediately after injury. There was no improvement in pain following ice and ankle became more swollen, went to urgent care next day. Xrays were negative for fx and she was given aircast and orthopedic shoe. Unable to use cane or crutch due to current wrist injury. Since incident she has not been able to return to work because she is still unable to walk or stand. She uses voltaren and ice daily for pain and swelling. Denies any previous injury.  7/8 interval - injured her L ankle 7/4 rolling it in parking lot walking down a slope with wedge shoes, has had trouble walking, using crutches, swelling and pain  7/29 interval - fell on R shoulder when in bath, had a bruise but feels mostly recovered from this.  Still unable to WB on ankle, swelling when she doesn't ice or elevate  8/19 interval - moving better but still cannot do steps, started PT last week

## 2024-08-19 NOTE — PHYSICAL EXAM
[de-identified] : Constitutional: Well-nourished, well-developed, No acute distress  Respiratory:  Good respiratory effort, no SOB  Psychiatric: Pleasant and normal affect, alert and oriented x3   Ankle: Skin: Clean dry and intact  Ankle: Moderate swelling ecchymosis of the anterolateral ankle and foot, no marked deformities or malalignment  Tenderness: ATFL, CFL, Lateral ankle, high ankle ROM: Mild limitation of PF and Inversion due to pain/swelling, NL DF and Eversion.   Resistive Testing: Mild pain with resisted eversion and DF.  painless resisted  plantar flexion, and inversion.   Special tests: + anterior drawer for pain without laxity, + talar tilt for pain without laxity 5/5 EHL TA GS SILT L3-S1 2+ dp pt wwp bcr  [de-identified] : I independently reviewed and interpreted the xrays of the ankle - no fracture, no dislocation or OA.  No other acute osseous abnormalities.

## 2024-08-19 NOTE — DISCUSSION/SUMMARY
[de-identified] : 43yF pw L high ankle sprain.  The patient was extensively counseled on treatment options including but not limited to observation, rest/activity modification, bracing, anti-inflammatory medications, physical therapy, injections, and surgery.  The natural history of the disease was thoroughly explained.  We discussed that the majority of the time, this condition can be initially treated conservatively. The patient will proceed with: -adv to wbat in CAM boot, PT -diclofenac rx provided - pt instructed to take with meals and not with other nsaid's including advil, aleve, and toradol.  The pt understands to stop taking the medication if any stomach sx develop or they develop any type of bleeding or bruising, at which point they will present to their PMD -pt was instructed on the importance of resting, icing and elevating to minimize swelling -RTC 3w   I have personally obtained the history, reviewed the ROS as noted, and performed the physical examination today.  The patient and I discussed the assessment and options and developed the plan.  All questions were answered and the patient stated their understanding of the treatment plan and appreciation of the visit.   My cumulative time spent on this patient's visit included: Preparation for the visit, review of the medical records, review of pertinent diagnostic studies, examination and counseling of the patient on the above diagnosis, treatment plan and prognosis, orders of diagnostic tests, medications and/or appropriate procedures and documentation in the medical records of today's visit.   Ceasar Berger MD

## 2024-09-09 ENCOUNTER — APPOINTMENT (OUTPATIENT)
Dept: ORTHOPEDIC SURGERY | Facility: CLINIC | Age: 44
End: 2024-09-09
Payer: OTHER MISCELLANEOUS

## 2024-09-09 DIAGNOSIS — S93.492A SPRAIN OF OTHER LIGAMENT OF LEFT ANKLE, INITIAL ENCOUNTER: ICD-10-CM

## 2024-09-09 PROCEDURE — 99213 OFFICE O/P EST LOW 20 MIN: CPT

## 2024-09-09 NOTE — PHYSICAL EXAM
[de-identified] : Constitutional: Well-nourished, well-developed, No acute distress  Respiratory:  Good respiratory effort, no SOB  Psychiatric: Pleasant and normal affect, alert and oriented x3   Ankle: Skin: Clean dry and intact  Ankle: Moderate swelling ecchymosis of the anterolateral ankle and foot, no marked deformities or malalignment  Tenderness: ATFL, CFL, Lateral ankle, high ankle ROM: Mild limitation of PF and Inversion due to pain/swelling, NL DF and Eversion.   Resistive Testing: Mild pain with resisted eversion and DF.  painless resisted  plantar flexion, and inversion.   Special tests: + anterior drawer for pain without laxity, + talar tilt for pain without laxity 5/5 EHL TA GS SILT L3-S1 2+ dp pt wwp bcr  [de-identified] : I independently reviewed and interpreted the xrays of the ankle - no fracture, no dislocation or OA.  No other acute osseous abnormalities.

## 2024-09-09 NOTE — ASSESSMENT
[FreeTextEntry1] : pt fell and twisted ankle 7/4 at work [FreeTextEntry5] : 100 [FreeTextEntry6] : working from home but cannot do stairs yet

## 2024-09-09 NOTE — HISTORY OF PRESENT ILLNESS
[de-identified] : Sharri is a 42yo F pw left ankle injury. DOI 07/4/2024, twisted her ankle while walking down a hill at work. She was unable to weightbear and walk afterwards without significant pain and iced her ankle immediately after injury. There was no improvement in pain following ice and ankle became more swollen, went to urgent care next day. Xrays were negative for fx and she was given aircast and orthopedic shoe. Unable to use cane or crutch due to current wrist injury. Since incident she has not been able to return to work because she is still unable to walk or stand. She uses voltaren and ice daily for pain and swelling. Denies any previous injury.  7/8 interval - injured her L ankle 7/4 rolling it in parking lot walking down a slope with wedge shoes, has had trouble walking, using crutches, swelling and pain  7/29 interval - fell on R shoulder when in bath, had a bruise but feels mostly recovered from this.  Still unable to WB on ankle, swelling when she doesn't ice or elevate  8/19 interval - moving better but still cannot do steps, started PT last week  9/9 - no assistive device, still needs CAM, had difficulty navigating around grocery store but improving w PT

## 2024-09-09 NOTE — DISCUSSION/SUMMARY
[de-identified] : 43yF pw L high ankle sprain.  The patient was extensively counseled on treatment options including but not limited to observation, rest/activity modification, bracing, anti-inflammatory medications, physical therapy, injections, and surgery.  The natural history of the disease was thoroughly explained.  We discussed that the majority of the time, this condition can be initially treated conservatively. The patient will proceed with: -wbat, transition to sturdy shoes prn -diclofenac rx provided - pt instructed to take with meals and not with other nsaid's including advil, aleve, and toradol.  The pt understands to stop taking the medication if any stomach sx develop or they develop any type of bleeding or bruising, at which point they will present to their PMD -pt was instructed on the importance of resting, icing and elevating to minimize swelling -RTC 3w   I have personally obtained the history, reviewed the ROS as noted, and performed the physical examination today.  The patient and I discussed the assessment and options and developed the plan.  All questions were answered and the patient stated their understanding of the treatment plan and appreciation of the visit.   My cumulative time spent on this patient's visit included: Preparation for the visit, review of the medical records, review of pertinent diagnostic studies, examination and counseling of the patient on the above diagnosis, treatment plan and prognosis, orders of diagnostic tests, medications and/or appropriate procedures and documentation in the medical records of today's visit.   Ceasar Berger MD

## 2024-10-07 ENCOUNTER — APPOINTMENT (OUTPATIENT)
Dept: ORTHOPEDIC SURGERY | Facility: CLINIC | Age: 44
End: 2024-10-07
Payer: OTHER MISCELLANEOUS

## 2024-10-07 DIAGNOSIS — S93.492A SPRAIN OF OTHER LIGAMENT OF LEFT ANKLE, INITIAL ENCOUNTER: ICD-10-CM

## 2024-10-07 PROCEDURE — 99213 OFFICE O/P EST LOW 20 MIN: CPT

## 2024-11-07 ENCOUNTER — APPOINTMENT (OUTPATIENT)
Dept: ORTHOPEDIC SURGERY | Facility: CLINIC | Age: 44
End: 2024-11-07
Payer: OTHER MISCELLANEOUS

## 2024-11-07 DIAGNOSIS — S93.492A SPRAIN OF OTHER LIGAMENT OF LEFT ANKLE, INITIAL ENCOUNTER: ICD-10-CM

## 2024-11-07 PROCEDURE — 99214 OFFICE O/P EST MOD 30 MIN: CPT

## 2024-11-19 ENCOUNTER — OUTPATIENT (OUTPATIENT)
Dept: OUTPATIENT SERVICES | Facility: HOSPITAL | Age: 44
LOS: 1 days | End: 2024-11-19
Payer: COMMERCIAL

## 2024-11-19 ENCOUNTER — APPOINTMENT (OUTPATIENT)
Dept: MRI IMAGING | Facility: CLINIC | Age: 44
End: 2024-11-19

## 2024-11-19 DIAGNOSIS — Z98.890 OTHER SPECIFIED POSTPROCEDURAL STATES: Chronic | ICD-10-CM

## 2024-11-19 DIAGNOSIS — Z90.49 ACQUIRED ABSENCE OF OTHER SPECIFIED PARTS OF DIGESTIVE TRACT: Chronic | ICD-10-CM

## 2024-11-19 DIAGNOSIS — S93.492A SPRAIN OF OTHER LIGAMENT OF LEFT ANKLE, INITIAL ENCOUNTER: ICD-10-CM

## 2024-11-19 PROCEDURE — 73721 MRI JNT OF LWR EXTRE W/O DYE: CPT | Mod: 26,LT

## 2024-11-19 PROCEDURE — 73721 MRI JNT OF LWR EXTRE W/O DYE: CPT

## 2024-11-25 ENCOUNTER — APPOINTMENT (OUTPATIENT)
Dept: ORTHOPEDIC SURGERY | Facility: CLINIC | Age: 44
End: 2024-11-25
Payer: OTHER MISCELLANEOUS

## 2024-11-25 DIAGNOSIS — M25.572 PAIN IN LEFT ANKLE AND JOINTS OF LEFT FOOT: ICD-10-CM

## 2024-11-25 PROCEDURE — 99213 OFFICE O/P EST LOW 20 MIN: CPT

## 2025-01-16 ENCOUNTER — APPOINTMENT (OUTPATIENT)
Dept: ORTHOPEDIC SURGERY | Facility: CLINIC | Age: 45
End: 2025-01-16
Payer: OTHER MISCELLANEOUS

## 2025-01-16 DIAGNOSIS — S93.492A SPRAIN OF OTHER LIGAMENT OF LEFT ANKLE, INITIAL ENCOUNTER: ICD-10-CM

## 2025-01-16 PROCEDURE — 99214 OFFICE O/P EST MOD 30 MIN: CPT

## 2025-01-29 ENCOUNTER — APPOINTMENT (OUTPATIENT)
Dept: PODIATRY | Facility: CLINIC | Age: 45
End: 2025-01-29
Payer: OTHER MISCELLANEOUS

## 2025-01-29 DIAGNOSIS — S93.602S UNSPECIFIED SPRAIN OF LEFT FOOT, SEQUELA: ICD-10-CM
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
## 2025-01-29 DIAGNOSIS — S93.492S SPRAIN OF OTHER LIGAMENT OF LEFT ANKLE, SEQUELA: ICD-10-CM

## 2025-01-29 DIAGNOSIS — S93.412S SPRAIN OF CALCANEOFIBULAR LIGAMENT OF LEFT ANKLE, SEQUELA: ICD-10-CM

## 2025-01-29 PROCEDURE — 73610 X-RAY EXAM OF ANKLE: CPT | Mod: LT

## 2025-01-29 PROCEDURE — 99203 OFFICE O/P NEW LOW 30 MIN: CPT
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
## 2025-03-17 ENCOUNTER — APPOINTMENT (OUTPATIENT)
Dept: PODIATRY | Facility: CLINIC | Age: 45
End: 2025-03-17
Payer: OTHER MISCELLANEOUS

## 2025-03-17 DIAGNOSIS — M25.572 PAIN IN LEFT ANKLE AND JOINTS OF LEFT FOOT: ICD-10-CM
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
## 2025-03-17 DIAGNOSIS — S93.492D SPRAIN OF OTHER LIGAMENT OF LEFT ANKLE, SUBSEQUENT ENCOUNTER: ICD-10-CM

## 2025-03-17 PROCEDURE — 99212 OFFICE O/P EST SF 10 MIN: CPT
